# Patient Record
Sex: MALE | Race: WHITE | URBAN - NONMETROPOLITAN AREA
[De-identification: names, ages, dates, MRNs, and addresses within clinical notes are randomized per-mention and may not be internally consistent; named-entity substitution may affect disease eponyms.]

---

## 2017-11-28 ENCOUNTER — HISTORY (OUTPATIENT)
Dept: FAMILY MEDICINE | Facility: OTHER | Age: 42
End: 2017-11-28

## 2017-11-28 ENCOUNTER — OFFICE VISIT - GICH (OUTPATIENT)
Dept: FAMILY MEDICINE | Facility: OTHER | Age: 42
End: 2017-11-28

## 2017-11-28 DIAGNOSIS — Z00.00 ENCOUNTER FOR GENERAL ADULT MEDICAL EXAMINATION WITHOUT ABNORMAL FINDINGS: ICD-10-CM

## 2017-11-28 DIAGNOSIS — Z80.42 FAMILY HISTORY OF MALIGNANT NEOPLASM OF PROSTATE: ICD-10-CM

## 2017-11-28 LAB
CHOL/HDL RATIO - HISTORICAL: 3.55
CHOLESTEROL TOTAL: 206 MG/DL
HDLC SERPL-MCNC: 58 MG/DL (ref 23–92)
LDLC SERPL CALC-MCNC: 135 MG/DL
NON-HDL CHOLESTEROL - HISTORICAL: 148 MG/DL
PROVIDER ORDERDED STATUS - HISTORICAL: ABNORMAL
PSA TOTAL (DIAGNOSTIC) - HISTORICAL: 1.5 NG/ML
TRIGL SERPL-MCNC: 67 MG/DL

## 2017-12-28 NOTE — PROGRESS NOTES
Patient Information     Patient Name MRN Sex Matty Mendieta [id number] Male 1975      Progress Notes by Teofilo Sotelo MD at 2017  8:23 AM     Author:  Teofilo Sotelo MD Service:  (none) Author Type:  Physician     Filed:  2017 12:02 PM Encounter Date:  2017 Status:  Signed     :  Teofilo Sotelo MD (Physician)              SUBJECTIVE:    Matty Machado is a 42 y.o. male who presents for px    HPI: Father had prostate cancer at age 49.  He is worried himself.  Nocturia once.  No retention symptoms.  Otherwise no significant concerns.  Sometimes will get numbness in his hands when sleeping, perhaps 2 times a night.    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  .    Gets eczema on hands.  Comes about every other year or so.  Hands and feet.  Is very sensitive to poison ivy.    Past Surgical History:      Procedure  Laterality Date     VASECTOMY     PROBLEM LIST:  Patient Active Problem List     Diagnosis  Code     Family history of prostate cancer in father Z80.42     PAST MEDICAL HISTORY:No past medical history on file.  SURGICAL HISTORY:  Past Surgical History:      Procedure  Laterality Date     VASECTOMY         SOCIAL HISTORY:  Social History     Social History        Marital status:       Spouse name: N/A     Number of children:  N/A     Years of education:  N/A     Occupational History      Not on file.     Social History Main Topics       Smoking status: Never Smoker     Smokeless tobacco: Never Used     Alcohol use No     Drug use: No     Sexual activity: Not on file     Other Topics  Concern     Not on file      Social History Narrative     Preload  2013                 FAMILY HISTORY:No family history on file.   CURRENT MEDICATIONS:   No current outpatient prescriptions on file.     No current facility-administered medications for this visit.      Medications have been reviewed by me and are current to the  "best of my knowledge and ability.    ALLERGIES:  Review of patient's allergies indicates no known allergies.    REVIEW OF SYSTEMS:  General: denies any general problems.  Eyes: denies problems  Ears/Nose/Throat: denies problems  Cardiovascular: denies problems  Respiratory: denies problems  Gastrointestinal: denies problems  Genitourinary - male: see HPI  Musculoskeletal: denies problems  Skin: denies problems  Neurologic: denies problems  Psychiatric: denies problems  Endocrine: denies problems  Heme/Lymphatic: denies problems  Allergic/Immunologic: denies problems  PHQ Depression Screening 11/28/2017   Date of PHQ exam (doc flow) 11/28/2017   1. Lack of interest/pleasure 0 - Not at all   2. Feeling down/depressed 0 - Not at all   PHQ-2 TOTAL SCORE 0   Some recent data might be hidden       OBJECTIVE:  /70  Pulse 64  Resp 14  Ht 1.88 m (6' 2\")  Wt 81.3 kg (179 lb 3.2 oz)  BMI 23.01 kg/m2  EXAM:   General Appearance: Pleasant, alert, appropriate appearance for age. No acute distress  Head Exam: Normal. Normocephalic, atraumatic.  Eye Exam:  Normal external eye, conjunctiva, lids, cornea. PARISH.  Ear Exam: Normal TM's bilaterally. Normal auditory canals and external ears. Non-tender.  Nose Exam: Normal external nose, mucus membranes, and septum.  OroPharynx Exam:  Dental hygiene adequate. Normal buccal mucosa. Normal pharynx.  Neck Exam:  Supple, no masses or nodes.  Thyroid Exam: No nodules or enlargement.  Chest/Respiratory Exam: Normal chest wall and respirations. Clear to auscultation.  Cardiovascular Exam: Regular rate and rhythm. S1, S2, no murmur, click, gallop, or rubs.  Gastrointestinal Exam: Soft, non-tender, no masses or organomegaly.  Rectal Exam: Normal sphincter tone. No masses noted.  Genitourinary Exam Male: Normal male genitalia. No discharge or penile ulcerations. No testicular masses or swelling.  Genitourinary Exam Male: left varicocele  Lymphatic Exam: Non-palpable nodes in neck, " clavicular, axillary, or inguinal regions.  Musculoskeletal Exam: Back is straight and non-tender, full ROM of upper and lower extremities.  Foot Exam: Left and right foot: good pedal pulses, no lesions, nail hygiene good.  Skin: no rash or abnormalities  Neurologic Exam: Nonfocal, symmetric DTRs, normal gross motor, tone coordination and no tremor.  Psychiatric Exam: Alert and oriented - appropriate affect.    ASSESSMENT/PLAN:    ICD-10-CM    1. Routine medical exam Z00.00 LIPID PANEL   2. Family history of prostate cancer in father Z80.42 PSA, TOTAL     BP Readings from Last 1 Encounters:11/28/17 : 128/70  Mr. Hernandez blood pressure is out of the normal range for adults. Per JNC-8 guidelines normal adult blood pressure is < 120/80, pre-hypertensive is between 120/80 and 139/89, and hypertension is 140/90 or greater. Risks of hypertension were discussed. Patient's strategy will be to recheck blood pressure in 12 months.    Teofilo Sotelo MD ....................  11/28/2017   8:48 AM

## 2017-12-30 NOTE — NURSING NOTE
Patient Information     Patient Name MRN Sex Matty Mendieta [id number] Male 1975      Nursing Note by Rosa Alex at 2017  8:15 AM     Author:  Rosa Alex Service:  (none) Author Type:  (none)     Filed:  2017  8:23 AM Encounter Date:  2017 Status:  Signed     :  Rosa Alex            Coming in for a physical   Rosa Alex ....................  2017   8:16 AM

## 2018-01-26 VITALS
WEIGHT: 179.2 LBS | DIASTOLIC BLOOD PRESSURE: 70 MMHG | HEIGHT: 74 IN | RESPIRATION RATE: 14 BRPM | SYSTOLIC BLOOD PRESSURE: 128 MMHG | HEART RATE: 64 BPM | BODY MASS INDEX: 23 KG/M2

## 2018-03-09 ENCOUNTER — DOCUMENTATION ONLY (OUTPATIENT)
Dept: FAMILY MEDICINE | Facility: OTHER | Age: 43
End: 2018-03-09

## 2018-07-23 NOTE — PROGRESS NOTES
Patient Information     Patient Name  Matty Machado MRN  [id number] Sex  Male   1975      Letter by Teofilo Sotelo MD at      Author:  Teofilo Sotelo MD Service:  (none) Author Type:  (none)    Filed:   Encounter Date:  2017 Status:  (Other)           Matty Machado  [id number] [place] [address]          2017    Dear Mr. Machado:    Following are the tests completed during your last clinic visit.  The results of these tests are normal and require no further attention unless otherwise noted.    Results for orders placed or performed in visit on 17      LIPID PANEL      Result  Value Ref Range    CHOLESTEROL,TOTAL 206 (H) <200 mg/dL    TRIGLYCERIDES 67 <150 mg/dL    HDL CHOLESTEROL 58 23 - 92 mg/dL    NON-HDL CHOLESTEROL 148 (H) <145 mg/dl    CHOL/HDL RATIO            3.55 <4.50                    LDL CHOLESTEROL 135 (H) <100 mg/dL    PROVIDER ORDERED STATUS FASTING    PSA, TOTAL      Result  Value Ref Range    PSA TOTAL (DIAGNOSTIC) 1.502 <=3.100 ng/mL         If you have any further questions or problems contact my office at  [phone].    Thank you,    Teofilo Sotelo MD